# Patient Record
Sex: MALE | Race: WHITE | HISPANIC OR LATINO | URBAN - METROPOLITAN AREA
[De-identification: names, ages, dates, MRNs, and addresses within clinical notes are randomized per-mention and may not be internally consistent; named-entity substitution may affect disease eponyms.]

---

## 2024-10-16 ENCOUNTER — APPOINTMENT (OUTPATIENT)
Age: 24
End: 2024-10-16

## 2025-01-23 ENCOUNTER — OFFICE VISIT (OUTPATIENT)
Age: 25
End: 2025-01-23
Payer: COMMERCIAL

## 2025-01-23 VITALS
OXYGEN SATURATION: 95 % | RESPIRATION RATE: 18 BRPM | WEIGHT: 252 LBS | HEIGHT: 65 IN | HEART RATE: 75 BPM | SYSTOLIC BLOOD PRESSURE: 146 MMHG | DIASTOLIC BLOOD PRESSURE: 76 MMHG | BODY MASS INDEX: 41.99 KG/M2 | TEMPERATURE: 97.7 F

## 2025-01-23 DIAGNOSIS — R68.89 FLU-LIKE SYMPTOMS: ICD-10-CM

## 2025-01-23 DIAGNOSIS — J98.01 BRONCHOSPASM: Primary | ICD-10-CM

## 2025-01-23 DIAGNOSIS — Z87.898 HISTORY OF NECK SWELLING: ICD-10-CM

## 2025-01-23 LAB
SARS-COV-2 AG UPPER RESP QL IA: NEGATIVE
VALID CONTROL: NORMAL

## 2025-01-23 PROCEDURE — 87811 SARS-COV-2 COVID19 W/OPTIC: CPT | Performed by: EMERGENCY MEDICINE

## 2025-01-23 PROCEDURE — S9088 SERVICES PROVIDED IN URGENT: HCPCS | Performed by: EMERGENCY MEDICINE

## 2025-01-23 PROCEDURE — 87636 SARSCOV2 & INF A&B AMP PRB: CPT | Performed by: EMERGENCY MEDICINE

## 2025-01-23 PROCEDURE — 99213 OFFICE O/P EST LOW 20 MIN: CPT | Performed by: EMERGENCY MEDICINE

## 2025-01-23 RX ORDER — METHYLPREDNISOLONE 4 MG/1
TABLET ORAL
Qty: 21 TABLET | Refills: 0 | Status: SHIPPED | OUTPATIENT
Start: 2025-01-23

## 2025-01-23 NOTE — PROGRESS NOTES
[facility]        NAME: Gabriel Mace is a 24 y.o. male  : 2000    MRN: [id number]  DATE: 2025  TIME: 6:18 PM    Assessment and Plan   Bronchospasm [J98.01]  1. Bronchospasm  methylPREDNISolone (Medrol) 4 MG tablet therapy pack      2. History of neck swelling  methylPREDNISolone (Medrol) 4 MG tablet therapy pack      3. Flu-like symptoms  Poct Covid 19 Rapid Antigen Test    Covid/Flu-Office Collect        Rapid Covid is NEG    Covid/flu sent    Patient Instructions     Patient Instructions    Check MY CHART for Covid/flu results in 24-72 hrs  F/u with PCP In 2-3 days; he can order an ultrasound of your thyroid  Proceed to the ER if symptoms get worse  Take Medrol dose pack until gone      Follow up with PCP in 3-5 days.  Proceed to  ER if symptoms worsen.    Chief Complaint     Chief Complaint   Patient presents with   • Cold Like Symptoms     Started a week ago, patient complains of cough, sneezing, itchy throat. Exposed to RSV.   • Neck Pain     Started 3 weeks ago.          History of Present Illness       24-year-old male with a chief complaint of cough sneezing and itchy throat.  Patient states his whole family had RSV and he has been sick the past several days.  Patient is also complaining of some swelling in the right side of his neck.  Patient states at 1 point his thyroid tests were abnormal but they have now been normalized.  Patient does admit to smoking medical marijuana.  Pt. States he has been out of work since the , and his grandfather was recently diagnosed with RSV.    Neck Pain   Pertinent negatives include no chest pain, fever, headaches or photophobia.       Review of Systems   Review of Systems   Constitutional:  Negative for diaphoresis, fatigue and fever.   HENT:  Positive for congestion. Negative for ear pain, nosebleeds and sore throat.    Eyes:  Negative for photophobia, pain, discharge and visual disturbance.   Respiratory:  Positive for cough. Negative  Observe. "for choking, chest tightness, shortness of breath and wheezing.    Cardiovascular:  Negative for chest pain and palpitations.   Gastrointestinal:  Negative for abdominal distention, abdominal pain, diarrhea and vomiting.   Genitourinary:  Negative for dysuria, flank pain and frequency.   Musculoskeletal:  Positive for neck pain. Negative for back pain, gait problem and joint swelling.   Skin:  Negative for color change and rash.   Neurological:  Negative for dizziness, syncope and headaches.   Psychiatric/Behavioral:  Negative for behavioral problems and confusion. The patient is not nervous/anxious.    All other systems reviewed and are negative.        Current Medications       Current Outpatient Medications:   •  methylPREDNISolone (Medrol) 4 MG tablet therapy pack, Use as directed on package, Disp: 21 tablet, Rfl: 0    Current Allergies     Allergies as of 01/23/2025 - Reviewed 01/23/2025   Allergen Reaction Noted   • Bee venom Swelling 07/22/2015   • Apple fruit extract - food allergy Itching 09/04/2024   • Banana extract allergy skin test - food allergy Itching 09/04/2024   • Pollen extract Eye Swelling 09/04/2024   • Dust mite extract Cough 07/22/2015            The following portions of the patient's history were reviewed and updated as appropriate: allergies, current medications, past family history, past medical history, past social history, past surgical history and problem list.     No past medical history on file.    No past surgical history on file.    No family history on file.      Medications have been verified.        Objective   /76 (BP Location: Right arm, Patient Position: Sitting)   Pulse 75   Temp 97.7 °F (36.5 °C)   Resp 18   Ht 5' 5\" (1.651 m)   Wt 114 kg (252 lb)   SpO2 95%   BMI 41.93 kg/m²        Physical Exam     Physical Exam  Vitals and nursing note reviewed.   Constitutional:       General: He is not in acute distress.     Appearance: Normal appearance. He is not " ill-appearing, toxic-appearing or diaphoretic.      Comments: Very pleasant 24-year-old male sitting on the exam table complaining of some swelling in the right side of his neck, coughing and some wheezing   HENT:      Head: Normocephalic and atraumatic.      Right Ear: Tympanic membrane normal.      Left Ear: Tympanic membrane normal.      Nose: Nose normal.      Mouth/Throat:      Mouth: Mucous membranes are moist.      Pharynx: Oropharynx is clear. No oropharyngeal exudate or posterior oropharyngeal erythema.   Eyes:      Extraocular Movements: Extraocular movements intact.      Pupils: Pupils are equal, round, and reactive to light.   Neck:      Vascular: No carotid bruit.      Comments: Neck, slightly enlarged area on the right anterior aspect of the neck could be related to the thyroid versus muscle strain  Cardiovascular:      Rate and Rhythm: Normal rate and regular rhythm.      Pulses: Normal pulses.      Heart sounds: Normal heart sounds.   Pulmonary:      Effort: Pulmonary effort is normal.      Breath sounds: Wheezing present.      Comments: Lungs: There are some slight expiratory wheezing in the left lower lobe otherwise clear to auscultation  Abdominal:      General: Abdomen is flat. Bowel sounds are normal. There is no distension.      Palpations: Abdomen is soft. There is no mass.      Tenderness: There is no abdominal tenderness. There is no right CVA tenderness, left CVA tenderness, guarding or rebound.      Hernia: No hernia is present.   Musculoskeletal:         General: No swelling, tenderness, deformity or signs of injury. Normal range of motion.      Cervical back: Normal range of motion and neck supple. No rigidity. No muscular tenderness.      Right lower leg: No edema.      Left lower leg: No edema.   Lymphadenopathy:      Cervical: No cervical adenopathy.   Skin:     General: Skin is warm and dry.      Coloration: Skin is not jaundiced or pale.      Findings: No bruising, erythema, lesion  or rash.      Comments: Multiple facial tatoos   Neurological:      General: No focal deficit present.      Mental Status: He is alert and oriented to person, place, and time.      Cranial Nerves: No cranial nerve deficit.      Motor: No weakness.   Psychiatric:         Mood and Affect: Mood normal.         Behavior: Behavior normal.

## 2025-01-23 NOTE — PATIENT INSTRUCTIONS
Check MY CHART for Covid/flu results in 24-72 hrs  F/u with PCP In 2-3 days; he can order an ultrasound of your thyroid  Proceed to the ER if symptoms get worse  Take Medrol dose pack until gone

## 2025-01-23 NOTE — LETTER
January 23, 2025     Patient: Gabriel Mace   YOB: 2000   Date of Visit: 1/23/2025       To Whom It May Concern:    It is my medical opinion that Gabriel Mace may return to work on 01/24/25.  Please excuse him from work since January 14, 2025. .    If you have any questions or concerns, please don't hesitate to call.         Sincerely,        Julianna Washington,     CC: No Recipients

## 2025-01-23 NOTE — LETTER
January 23, 2025     Patient: Gabriel Mace   YOB: 2000   Date of Visit: 1/23/2025       To Whom It May Concern:    It is my medical opinion that Gabriel Mace may return to work on 01/24/2025 .    If you have any questions or concerns, please don't hesitate to call.         Sincerely,        Julianna Washington, DO    CC: No Recipients

## 2025-01-24 LAB
FLUAV RNA RESP QL NAA+PROBE: NEGATIVE
FLUBV RNA RESP QL NAA+PROBE: NEGATIVE
SARS-COV-2 RNA RESP QL NAA+PROBE: NEGATIVE

## 2025-02-23 ENCOUNTER — OFFICE VISIT (OUTPATIENT)
Age: 25
End: 2025-02-23
Payer: COMMERCIAL

## 2025-02-23 VITALS
DIASTOLIC BLOOD PRESSURE: 63 MMHG | TEMPERATURE: 98.2 F | WEIGHT: 253 LBS | RESPIRATION RATE: 18 BRPM | OXYGEN SATURATION: 96 % | BODY MASS INDEX: 42.1 KG/M2 | SYSTOLIC BLOOD PRESSURE: 139 MMHG

## 2025-02-23 DIAGNOSIS — K04.7 PERIAPICAL ABSCESS WITHOUT SINUS: Primary | ICD-10-CM

## 2025-02-23 PROCEDURE — S9088 SERVICES PROVIDED IN URGENT: HCPCS | Performed by: PHYSICIAN ASSISTANT

## 2025-02-23 PROCEDURE — 99213 OFFICE O/P EST LOW 20 MIN: CPT | Performed by: PHYSICIAN ASSISTANT

## 2025-02-23 RX ORDER — DIPHENHYDRAMINE HYDROCHLORIDE AND LIDOCAINE HYDROCHLORIDE AND ALUMINUM HYDROXIDE AND MAGNESIUM HYDRO
10 KIT EVERY 4 HOURS PRN
Qty: 237 ML | Refills: 0 | Status: SHIPPED | OUTPATIENT
Start: 2025-02-23 | End: 2025-02-23

## 2025-02-23 NOTE — PROGRESS NOTES
[facility]        NAME: Gabriel Mace is a 24 y.o. male  : 2000    MRN: [id number]  DATE: 2025  TIME: 4:09 PM    Assessment and Plan   Periapical abscess without sinus [K04.7]  1. Periapical abscess             Initially discussed with the patient possibility of outpatient treatment however given the presence of the periapical abscess I recommend he go to the ER to have this drained and then be put on oral antibiotics.  Patient understands and is in agreement due to his aspiration risk with a periapical abscess due to his obesity.  He tells me he will go to [facility] to have this evaluated.    The patient and/or parent/legal guardian verbalized understanding of exam findings and   Treatment plan. We engaged in the shared decision-making process and treatment options were   discussed at length with the patient.  All questions, concerns and complaints were answered and   addressed to the patient's' and/or parent/legal guardians's satisfaction.    Patient Instructions   There are no Patient Instructions on file for this visit.    Follow up with PCP in 3-5 days.  Proceed to  ER if symptoms worsen.    If tests are performed, our office will contact you with results only if   changes need to made to the care plan discussed with you at the visit.   You can review your full results on [facility].     Chief Complaint     Chief Complaint   Patient presents with   • Oral Pain     Right sided upper mouth pain that feels like a lump started this am when he woke up. Also staes possible swollen lymph node         History of Present Illness       HPI  Pt  reports starting today right upper dided mouth frida, feels like a lump in the mouth. Possible swollen lymph nodes. Pinching feeling right masseter. Submandibular pain. No fever or chills. Has been able to swallow foods and liquids.     Review of Systems   Review of Systems  All other related systems reviewed with patient or  accompanying historian and are negative except as noted in HPI    Current Medications       Current Outpatient Medications:   •  methylPREDNISolone (Medrol) 4 MG tablet therapy pack, Use as directed on package (Patient not taking: Reported on 2/23/2025), Disp: 21 tablet, Rfl: 0    Current Allergies     Allergies as of 02/23/2025 - Reviewed 02/23/2025   Allergen Reaction Noted   • Bee venom Swelling 07/22/2015   • Apple fruit extract - food allergy Itching 09/04/2024   • Banana extract allergy skin test - food allergy Itching 09/04/2024   • Pollen extract Eye Swelling 09/04/2024   • Dust mite extract Cough 07/22/2015            The following portions of the patient's history were reviewed and updated as appropriate: allergies, current medications, past family history, past medical history, past social history, past surgical history and problem list.     Past Medical History:   Diagnosis Date   • DVT (deep venous thrombosis) (HCC)        History reviewed. No pertinent surgical history.    History reviewed. No pertinent family history.      Medications have been verified.        Objective   /63 (Patient Position: Sitting, Cuff Size: Large)   Temp 98.2 °F (36.8 °C)   Resp 18   Wt 115 kg (253 lb)   SpO2 96%   BMI 42.10 kg/m²   No LMP for male patient.       Physical Exam     Physical Exam  Constitutional:       General: He is not in acute distress.     Appearance: He is well-developed.   HENT:      Head: Normocephalic and atraumatic.      Mouth/Throat:      Comments: Over the buccal surface of the right second upper molar there is a area of gingival swelling with no visible purulence that is tender to palpation.  Overall visually the face is not swollen there is no trismus oropharynx is patent.  No palpable lymphadenopathy in the neck or submandibular regions.  Eyes:      General: No scleral icterus.     Conjunctiva/sclera: Conjunctivae normal.   Pulmonary:      Effort: Pulmonary effort is normal. No  "respiratory distress.      Breath sounds: No stridor.   Musculoskeletal:      Cervical back: Normal range of motion and neck supple.   Skin:     General: Skin is warm and dry.   Neurological:      Mental Status: He is alert and oriented to person, place, and time.   Psychiatric:         Behavior: Behavior normal.         Ortho Exam      Procedures  No Procedures performed today        Note: Portions of this record may have been created with voice recognition software. Occasional wrong word or \"sound a like\" substitutions may have occurred due to the inherent limitations of voice recognition software. Please read the chart carefully and recognize, using context, where substitutions have occurred.*      "